# Patient Record
Sex: FEMALE | Race: BLACK OR AFRICAN AMERICAN | NOT HISPANIC OR LATINO | Employment: FULL TIME | URBAN - METROPOLITAN AREA
[De-identification: names, ages, dates, MRNs, and addresses within clinical notes are randomized per-mention and may not be internally consistent; named-entity substitution may affect disease eponyms.]

---

## 2018-09-28 ENCOUNTER — HOSPITAL ENCOUNTER (EMERGENCY)
Facility: HOSPITAL | Age: 33
Discharge: HOME | End: 2018-09-29
Attending: EMERGENCY MEDICINE

## 2018-09-28 DIAGNOSIS — S16.1XXA CERVICAL MYOFASCIAL STRAIN, INITIAL ENCOUNTER: Primary | ICD-10-CM

## 2018-09-28 DIAGNOSIS — M54.2 NECK PAIN: ICD-10-CM

## 2018-09-28 PROCEDURE — 99283 EMERGENCY DEPT VISIT LOW MDM: CPT | Mod: 25

## 2018-09-28 PROCEDURE — 96372 THER/PROPH/DIAG INJ SC/IM: CPT

## 2018-09-28 RX ORDER — KETOROLAC TROMETHAMINE 30 MG/ML
60 INJECTION, SOLUTION INTRAMUSCULAR; INTRAVENOUS
Status: COMPLETED | OUTPATIENT
Start: 2018-09-28 | End: 2018-09-29

## 2018-09-29 VITALS
HEART RATE: 76 BPM | WEIGHT: 125 LBS | OXYGEN SATURATION: 99 % | SYSTOLIC BLOOD PRESSURE: 122 MMHG | BODY MASS INDEX: 22.14 KG/M2 | DIASTOLIC BLOOD PRESSURE: 82 MMHG | TEMPERATURE: 98 F | RESPIRATION RATE: 20 BRPM

## 2018-09-29 PROCEDURE — 63600175 PHARM REV CODE 636 W HCPCS: Performed by: EMERGENCY MEDICINE

## 2018-09-29 RX ORDER — KETOROLAC TROMETHAMINE 10 MG/1
10 TABLET, FILM COATED ORAL EVERY 8 HOURS
Qty: 15 TABLET | Refills: 0 | Status: SHIPPED | OUTPATIENT
Start: 2018-09-29 | End: 2018-10-04

## 2018-09-29 RX ADMIN — KETOROLAC TROMETHAMINE 60 MG: 30 INJECTION, SOLUTION INTRAMUSCULAR at 12:09

## 2018-09-29 NOTE — ED NOTES
Pt updated on plan of care. Informed that x-ray has resulted and MD will be in shortly in order to discuss results. Understanding verbalized. Pt remains AAOx4. Respirations even and unlabored. Skin warm and dry. NAD noted. Call light remains in reach. Pt instructed to notify staff should needs arise. Understanding verbalized. Awaiting further orders and will continue to monitor.

## 2018-09-29 NOTE — ED TRIAGE NOTES
"pt reports neck pain/stiffness x 2-3 days; states "I can't even turn my head now." No medications taken to treat. Denies recent injury.  "

## 2018-09-29 NOTE — ED PROVIDER NOTES
"Encounter Date: 9/28/2018       History     Chief Complaint   Patient presents with    Neck Pain     pt reports neck pain/stiffness x 2-3 days; states "I can't even turn my head now." No medications taken to treat. Denies recent injury. Right ear pain.    Otalgia     The history is provided by the patient.   Neck Pain    This is a new problem. The current episode started two days ago. The problem occurs constantly. The problem has been unchanged. The pain is associated with an unknown factor. The pain is present in the left side. The quality of the pain is described as stabbing. The pain does not radiate. The pain is at a severity of 6/10. The symptoms are aggravated by position. The pain is the same all the time. Pertinent negatives include no chest pain, no syncope, no numbness, no weight loss, no headaches and no leg pain. She has tried nothing for the symptoms.     Review of patient's allergies indicates:  No Known Allergies  Past Medical History:   Diagnosis Date    Scoliosis      History reviewed. No pertinent surgical history.  No family history on file.  Social History     Tobacco Use    Smoking status: Never Smoker   Substance Use Topics    Alcohol use: Yes     Frequency: Never     Comment: occasionally    Drug use: Not on file     Review of Systems   Constitutional: Negative for weight loss.   Cardiovascular: Negative for chest pain and syncope.   Musculoskeletal: Positive for neck pain.   Neurological: Negative for numbness and headaches.   All other systems reviewed and are negative.      Physical Exam     Initial Vitals [09/28/18 2223]   BP Pulse Resp Temp SpO2   122/82 79 20 97.9 °F (36.6 °C) 99 %      MAP       --         Physical Exam    Nursing note and vitals reviewed.  Constitutional: She appears well-developed and well-nourished.   HENT:   Head: Normocephalic and atraumatic.   Eyes: Conjunctivae and EOM are normal.   Neck: Muscular tenderness present. No spinous process tenderness present. " Decreased range of motion present. No neck rigidity.       Cardiovascular: Normal rate, regular rhythm and normal heart sounds.   Pulmonary/Chest: Breath sounds normal. She has no wheezes. She has no rhonchi. She has no rales.   Abdominal: Soft. She exhibits no distension. There is no tenderness. There is no rebound and no guarding.   Neurological: She is alert. She has normal strength. GCS score is 15. GCS eye subscore is 4. GCS verbal subscore is 5. GCS motor subscore is 6.   Skin: Skin is warm and dry. Capillary refill takes less than 2 seconds.   Psychiatric: She has a normal mood and affect. Her behavior is normal. Judgment and thought content normal.         ED Course   Procedures  Labs Reviewed - No data to display       Imaging Results          X-Ray Cervical Spine AP And Lateral (Final result)  Result time 09/28/18 22:51:06    Final result by Cruz Bradley MD (09/28/18 22:51:06)                 Impression:      Straightening of the normal cervical lordosis.    Odontoid partially obscured by overlapping structures.  Otherwise negative for evidence of fracture.      Electronically signed by: Cruz Bradley MD  Date:    09/28/2018  Time:    22:51             Narrative:    EXAMINATION:  XR CERVICAL SPINE AP LATERAL    CLINICAL HISTORY:  Cervicalgia    COMPARISON:  None.    FINDINGS:  Straightening of the normal cervical lordosis.  Odontoid is partially obscured by overlapping structures.  All cervical vertebral bodies are normal in height.  Disc spaces are well preserved.  Prevertebral soft tissues are normal.                              X-Rays:   Independently Interpreted Readings:   Other Readings:  No fracture or dislocation    Medical Decision Making:   Clinical Tests:   Radiological Study: Ordered and Reviewed                      Clinical Impression:   The primary encounter diagnosis was Cervical myofascial strain, initial encounter. A diagnosis of Neck pain was also pertinent to this  visit.      Disposition:   Disposition: Discharged  Condition: Stable                        Lorenza Vargas MD  09/29/18 0030

## 2020-01-30 ENCOUNTER — HOSPITAL ENCOUNTER (EMERGENCY)
Facility: HOSPITAL | Age: 35
Discharge: HOME | End: 2020-01-30
Attending: FAMILY MEDICINE
Payer: COMMERCIAL

## 2020-01-30 VITALS
RESPIRATION RATE: 20 BRPM | OXYGEN SATURATION: 99 % | SYSTOLIC BLOOD PRESSURE: 113 MMHG | HEART RATE: 97 BPM | DIASTOLIC BLOOD PRESSURE: 77 MMHG | TEMPERATURE: 99 F

## 2020-01-30 DIAGNOSIS — J06.9 VIRAL URI: ICD-10-CM

## 2020-01-30 DIAGNOSIS — J10.1 INFLUENZA A: Primary | ICD-10-CM

## 2020-01-30 LAB
INFLUENZA A, MOLECULAR: POSITIVE
INFLUENZA B, MOLECULAR: NEGATIVE
SPECIMEN SOURCE: ABNORMAL

## 2020-01-30 PROCEDURE — 87502 INFLUENZA DNA AMP PROBE: CPT | Mod: ER

## 2020-01-30 PROCEDURE — 99284 EMERGENCY DEPT VISIT MOD MDM: CPT | Mod: ER

## 2020-01-30 PROCEDURE — 25000003 PHARM REV CODE 250: Mod: ER | Performed by: FAMILY MEDICINE

## 2020-01-30 RX ORDER — GUAIFENESIN AND DEXTROMETHORPHAN HYDROBROMIDE 1200; 60 MG/1; MG/1
1 TABLET, EXTENDED RELEASE ORAL 2 TIMES DAILY PRN
Qty: 20 TABLET | Refills: 0 | Status: SHIPPED | OUTPATIENT
Start: 2020-01-30 | End: 2023-02-16

## 2020-01-30 RX ORDER — BENZONATATE 200 MG/1
200 CAPSULE ORAL 3 TIMES DAILY PRN
Qty: 30 CAPSULE | Refills: 0 | Status: SHIPPED | OUTPATIENT
Start: 2020-01-30 | End: 2020-02-09

## 2020-01-30 RX ORDER — ACETAMINOPHEN 500 MG
1000 TABLET ORAL
Status: COMPLETED | OUTPATIENT
Start: 2020-01-30 | End: 2020-01-30

## 2020-01-30 RX ADMIN — ACETAMINOPHEN 1000 MG: 500 TABLET ORAL at 07:01

## 2020-01-31 NOTE — ED PROVIDER NOTES
Encounter Date: 1/30/2020       History     Chief Complaint   Patient presents with    Fever     fever body aches, cough congestion xs 4 days     34-year-old female presents with diffuse body aches, nasal congestion and cough for last 4 days.  He has been taking over-the-counter medication.  Fever on arrival is 102.4.  No respiratory distress.    The history is provided by the patient.     Review of patient's allergies indicates:  No Known Allergies  Past Medical History:   Diagnosis Date    Scoliosis      History reviewed. No pertinent surgical history.  History reviewed. No pertinent family history.  Social History     Tobacco Use    Smoking status: Never Smoker    Smokeless tobacco: Never Used   Substance Use Topics    Alcohol use: Yes     Frequency: Never     Comment: occasionally    Drug use: Never     Review of Systems   Constitutional: Positive for activity change, appetite change, chills and fever.   HENT: Negative for congestion, drooling, ear pain, mouth sores, nosebleeds, sinus pressure and sore throat.    Eyes: Negative for pain, discharge and visual disturbance.   Respiratory: Positive for cough. Negative for choking, shortness of breath and wheezing.    Cardiovascular: Negative for chest pain.   Gastrointestinal: Negative for abdominal pain, blood in stool, constipation, nausea and vomiting.   Genitourinary: Negative for dysuria.   Musculoskeletal: Negative for back pain and neck pain.   Skin: Negative for color change, pallor and rash.        No rash, no erythema,no bruises, no pallor,    Neurological: Negative for seizures, light-headedness and headaches.   All other systems reviewed and are negative.      Physical Exam     Initial Vitals [01/30/20 1856]   BP Pulse Resp Temp SpO2   118/72 107 20 (!) 102.4 °F (39.1 °C) 96 %      MAP       --         Physical Exam    Nursing note and vitals reviewed.  Constitutional: Vital signs are normal. She appears well-developed and well-nourished. She is  not diaphoretic. She is active. No distress.   HENT:   Head: Normocephalic and atraumatic.   Right Ear: Tympanic membrane normal.   Left Ear: Tympanic membrane normal.   Nose: Nose normal.   Mouth/Throat: Oropharynx is clear and moist.   Eyes: Conjunctivae, EOM and lids are normal. Pupils are equal, round, and reactive to light.   Neck: Trachea normal, normal range of motion and full passive range of motion without pain. Neck supple. Normal range of motion present. No neck rigidity.   Cardiovascular: Normal rate, regular rhythm, S1 normal, S2 normal, normal heart sounds, intact distal pulses and normal pulses.   Pulmonary/Chest: Breath sounds normal. No respiratory distress. She has no wheezes. She has no rhonchi. She has no rales.   Abdominal: Soft. Normal appearance and bowel sounds are normal. She exhibits no distension. There is no tenderness.   Musculoskeletal: Normal range of motion.   Lymphadenopathy:     She has no cervical adenopathy.   Neurological: She is alert and oriented to person, place, and time. She has normal strength and normal reflexes. No cranial nerve deficit or sensory deficit. GCS score is 15. GCS eye subscore is 4. GCS verbal subscore is 5. GCS motor subscore is 6.   Skin: Skin is warm and intact. Capillary refill takes less than 2 seconds. No abrasion, no bruising and no rash noted.   Psychiatric: She has a normal mood and affect. Her speech is normal and behavior is normal. Judgment and thought content normal. She is not actively hallucinating. Cognition and memory are normal. She is attentive.         ED Course   Procedures  Labs Reviewed   INFLUENZA A & B BY MOLECULAR - Abnormal; Notable for the following components:       Result Value    Influenza A, Molecular Positive (*)     All other components within normal limits          Imaging Results    None          Medical Decision Making:   Initial Assessment:   34-year-old female presents with upper respiratory infection with  fever.  Differential Diagnosis:   URI, influenza,  Bronchitis.  Clinical Tests:   Lab Tests: Ordered and Reviewed       <> Summary of Lab: Patient is positive for flu A.  ED Management:  Influenza a positive. Patient had symptoms for last 4 days.  No role of Tamiflu.  Patient is treated with Tylenol, Motrin.  Tessalon Perles for cough.  Mucinex DM and advised to take adequate fluids.  Follow-up ED with any worsening symptoms.                                 Clinical Impression:       ICD-10-CM ICD-9-CM   1. Influenza A J10.1 487.1   2. Viral URI J06.9 465.9         Disposition:   Disposition: Discharged  Condition: Stable                     Rex Haro MD  01/30/20 3385

## 2020-01-31 NOTE — ED TRIAGE NOTES
"Reports to ED c c/o cough, congestion, and fever x 4 days.  States took motrin, however; none today. States symptoms got worse. Non productive cough. States "when I breathe it hurts my chest." Also reports lower back pain. HX of scoliosis.   "

## 2020-09-04 ENCOUNTER — HOSPITAL ENCOUNTER (EMERGENCY)
Facility: HOSPITAL | Age: 35
Discharge: ANOTHER HOSPITAL | End: 2020-09-05
Attending: EMERGENCY MEDICINE
Payer: COMMERCIAL

## 2020-09-04 DIAGNOSIS — S61.419A HAND LACERATION INVOLVING TENDON, INITIAL ENCOUNTER: Primary | ICD-10-CM

## 2020-09-04 DIAGNOSIS — S66.929A HAND LACERATION INVOLVING TENDON, INITIAL ENCOUNTER: Primary | ICD-10-CM

## 2020-09-04 PROCEDURE — 25000003 PHARM REV CODE 250: Mod: ER | Performed by: EMERGENCY MEDICINE

## 2020-09-04 PROCEDURE — 90715 TDAP VACCINE 7 YRS/> IM: CPT | Mod: ER | Performed by: EMERGENCY MEDICINE

## 2020-09-04 PROCEDURE — 63600175 PHARM REV CODE 636 W HCPCS: Mod: ER | Performed by: EMERGENCY MEDICINE

## 2020-09-04 PROCEDURE — 12002 RPR S/N/AX/GEN/TRNK2.6-7.5CM: CPT | Mod: ER

## 2020-09-04 PROCEDURE — 90471 IMMUNIZATION ADMIN: CPT | Mod: ER | Performed by: EMERGENCY MEDICINE

## 2020-09-04 PROCEDURE — 96365 THER/PROPH/DIAG IV INF INIT: CPT | Mod: ER

## 2020-09-04 PROCEDURE — 99291 CRITICAL CARE FIRST HOUR: CPT | Mod: 25,ER

## 2020-09-04 PROCEDURE — 96376 TX/PRO/DX INJ SAME DRUG ADON: CPT | Mod: ER

## 2020-09-04 PROCEDURE — 96375 TX/PRO/DX INJ NEW DRUG ADDON: CPT | Mod: ER

## 2020-09-04 RX ORDER — ONDANSETRON 2 MG/ML
4 INJECTION INTRAMUSCULAR; INTRAVENOUS
Status: COMPLETED | OUTPATIENT
Start: 2020-09-04 | End: 2020-09-04

## 2020-09-04 RX ORDER — MORPHINE SULFATE 4 MG/ML
2 INJECTION, SOLUTION INTRAMUSCULAR; INTRAVENOUS
Status: COMPLETED | OUTPATIENT
Start: 2020-09-04 | End: 2020-09-04

## 2020-09-04 RX ORDER — LIDOCAINE HYDROCHLORIDE 10 MG/ML
10 INJECTION, SOLUTION EPIDURAL; INFILTRATION; INTRACAUDAL; PERINEURAL
Status: COMPLETED | OUTPATIENT
Start: 2020-09-04 | End: 2020-09-05

## 2020-09-04 RX ORDER — CEFAZOLIN SODIUM 2 G/50ML
2 SOLUTION INTRAVENOUS
Status: COMPLETED | OUTPATIENT
Start: 2020-09-04 | End: 2020-09-05

## 2020-09-04 RX ADMIN — MORPHINE SULFATE 2 MG: 4 INJECTION INTRAVENOUS at 11:09

## 2020-09-04 RX ADMIN — CEFAZOLIN SODIUM 2 G: 2 SOLUTION INTRAVENOUS at 11:09

## 2020-09-04 RX ADMIN — CLOSTRIDIUM TETANI TOXOID ANTIGEN (FORMALDEHYDE INACTIVATED), CORYNEBACTERIUM DIPHTHERIAE TOXOID ANTIGEN (FORMALDEHYDE INACTIVATED), BORDETELLA PERTUSSIS TOXOID ANTIGEN (GLUTARALDEHYDE INACTIVATED), BORDETELLA PERTUSSIS FILAMENTOUS HEMAGGLUTININ ANTIGEN (FORMALDEHYDE INACTIVATED), BORDETELLA PERTUSSIS PERTACTIN ANTIGEN, AND BORDETELLA PERTUSSIS FIMBRIAE 2/3 ANTIGEN 0.5 ML: 5; 2; 2.5; 5; 3; 5 INJECTION, SUSPENSION INTRAMUSCULAR at 11:09

## 2020-09-04 RX ADMIN — SODIUM CHLORIDE 1000 ML: 0.9 INJECTION, SOLUTION INTRAVENOUS at 11:09

## 2020-09-04 RX ADMIN — ONDANSETRON 4 MG: 2 INJECTION INTRAMUSCULAR; INTRAVENOUS at 11:09

## 2020-09-05 VITALS
RESPIRATION RATE: 18 BRPM | HEIGHT: 63 IN | TEMPERATURE: 99 F | WEIGHT: 138 LBS | SYSTOLIC BLOOD PRESSURE: 107 MMHG | BODY MASS INDEX: 24.45 KG/M2 | HEART RATE: 90 BPM | DIASTOLIC BLOOD PRESSURE: 70 MMHG | OXYGEN SATURATION: 100 %

## 2020-09-05 LAB
B-HCG UR QL: NEGATIVE
SARS-COV-2 RDRP RESP QL NAA+PROBE: NEGATIVE

## 2020-09-05 PROCEDURE — U0002 COVID-19 LAB TEST NON-CDC: HCPCS | Mod: ER

## 2020-09-05 PROCEDURE — 25000003 PHARM REV CODE 250: Mod: ER | Performed by: EMERGENCY MEDICINE

## 2020-09-05 PROCEDURE — 81025 URINE PREGNANCY TEST: CPT | Mod: ER

## 2020-09-05 PROCEDURE — 63600175 PHARM REV CODE 636 W HCPCS: Mod: ER | Performed by: EMERGENCY MEDICINE

## 2020-09-05 RX ORDER — MORPHINE SULFATE 4 MG/ML
2 INJECTION, SOLUTION INTRAMUSCULAR; INTRAVENOUS
Status: COMPLETED | OUTPATIENT
Start: 2020-09-05 | End: 2020-09-05

## 2020-09-05 RX ADMIN — MORPHINE SULFATE 2 MG: 4 INJECTION INTRAVENOUS at 01:09

## 2020-09-05 RX ADMIN — LIDOCAINE HYDROCHLORIDE 100 MG: 10 INJECTION, SOLUTION EPIDURAL; INFILTRATION; INTRACAUDAL; PERINEURAL at 12:09

## 2020-09-05 NOTE — ED NOTES
Adult Physical Assessment  LOC: 35y.o. female verified via two identifiers.  The patient is awake, alert, oriented and speaking appropriately at this time.. (Reports anxious and upset with herself from cutting hand)  APPEARANCE: Patient resting comfortably and appears to be in no acute distress at this time. Patient is clean and well groomed.  SKIN:The skin is warm and dry, color consistent with ethnicity, patient has normal skin turgor and moist mucus membranes, 1.5cm lac to lt thumb & aprox 4 inc open laceration to left hand. Bleeding - pressure dressing applied.  MUSCULOSKELETAL: Patient moving all extremities well, no obvious swelling or deformities noted.   RESPIRATORY: Airway is open and patent, respirations are spontaneous, patient has a normal effort and rate, no accessory muscle use noted. Visible chest rise noted.  CARDIAC: Patient has a normal rate and rhythm, no periphreal edema noted in any extremity, capillary refill < 3 seconds in all extremities. Bilateral radial peripheral pulse +2.

## 2020-09-05 NOTE — ED NOTES
Report called to Katie QUEVEDO at [facility]  Bedside report given to Ashok with [address] [facility].  Pt transferred in stable condition. Copy of ER records and Radiology in envelope given to paramedic.

## 2020-09-05 NOTE — ED PROVIDER NOTES
COVID Statement  The [place] [employer] and Danial Holloway, Governor of the [facility], have declared a State of Public Health Emergency due to the spread of a novel coronavirus and disease (COVID-19).  There is no currently accepted treatment except conservative measures and respiratory support if appropriate.  This has lead to significant resource capacity and potential delays in care.      Encounter Date: 9/4/2020       History     Chief Complaint   Patient presents with    Laceration     36 y/o F to er with large laceration on her left hand. Pt has supercial 1.5 cm cut on conner aspect of rt thumb & approx 5 inch open laceration to from mid 5th digit to bottom of palm, actively bleeding. Pressure dressing applied with guaze.      Fernanda Gabriel is a 35 y.o. female who  has a past medical history of Scoliosis.      She presents the ER today for laceration to her left hand.  She was using a knife in kitchen and 'moving too fast' when she accidentally cut herself. She is unable to move her little and ring finger. She denies numbness or other injuries. Last tetanus unknown.             Review of patient's allergies indicates:  No Known Allergies  Past Medical History:   Diagnosis Date    Scoliosis      History reviewed. No pertinent surgical history.  History reviewed. No pertinent family history.  Social History     Tobacco Use    Smoking status: Never Smoker    Smokeless tobacco: Never Used   Substance Use Topics    Alcohol use: Yes     Frequency: Never     Comment: occasionally    Drug use: Never     Review of Systems   Constitutional: Negative for fever.   Respiratory: Negative for shortness of breath.    Cardiovascular: Negative for chest pain.   Skin: Positive for wound.   Neurological: Positive for weakness. Negative for numbness.   Hematological: Does not bruise/bleed easily.       Physical Exam     Initial Vitals [09/04/20 2246]   BP Pulse Resp Temp SpO2   (!) 124/57 91 (!)  23 99.4 °F (37.4 °C) 100 %      MAP       --         Physical Exam    Constitutional: No distress.   HENT:   Head: Normocephalic and atraumatic.   Eyes: Conjunctivae are normal.   Cardiovascular: Intact distal pulses.   Pulmonary/Chest: No respiratory distress.   Musculoskeletal:      Comments: Left upper extremity:  Sensation intact to light touch.  Radial pulse palpable    Approximately 8 cm avulsion to medial palm as shown  After wound exploration flexor tendon appears to be severed muscle belly also severed    Patient unable to flex little and ring finger    Small arterial bleeding that stopped after direct pressure    4 cm very superficial laceration along left thumb on the palmar side.  Able to oppose thumb.  No flexion deficit   Neurological: She is alert.   Skin: Skin is warm and dry.   Psychiatric: She has a normal mood and affect.                 ED Course   Lac Repair    Date/Time: 9/5/2020 3:34 AM  Performed by: Donnell Ugalde Jr., MD  Authorized by: Donnell Ugalde Jr., MD   Consent Done: Emergent Situation  Body area: upper extremity  Location details: left thumb  Laceration length: 4 cm  Tendon involvement: none  Nerve involvement: none  Vascular damage: no  Irrigation solution: saline  Irrigation method: jet lavage  Amount of cleaning: standard  Debridement: none  Degree of undermining: none  Skin closure: glue  Approximation: close  Dressing: Steri-Strips  Patient tolerance: Patient tolerated the procedure well with no immediate complications        Labs Reviewed   PREGNANCY TEST, URINE RAPID          Imaging Results          X-Ray Hand 3 View Left (Final result)  Result time 09/04/20 23:24:18    Final result by Maldonado Wells III, MD (09/04/20 23:24:18)                 Impression:      No acute bony abnormality suggested.      Electronically signed by: Maldonado Wells MD  Date:    09/04/2020  Time:    23:24             Narrative:    EXAMINATION:  XR HAND COMPLETE 3 VIEW LEFT    CLINICAL  HISTORY:  laceration;    COMPARISON:  None    FINDINGS:  No acute fracture.  No dislocation.  No significant arthritic change.  The fingers are held in slight flexion limiting detail.                                 Medical Decision Making:   Differential Diagnosis:   Differential Diagnosis includes, but is not limited to:  Open fracture, vascular injury, tendon/ligament injury, nerve injury, retained foreign body, superficial laceration, abrasion.    ED Management:  Patient with laceration concerning for flexor tendon laceration.  Likely small arterial bleed that is hemostatic.  Pulses palpable.  Her ring and little finger appear warm with good cap refill at this time.    Her tetanus was updated    She is given 2 g of Ancef    Wound to medial hand was irrigated with 100 cc of normal saline and dressed in a wet-to-dry dressing with Coban    X-ray negative for fracture    Patient will be transferred to [facility] for hand evaluation.  She left here without acute event.              Attending Attestation:         Attending Critical Care:   Critical Care Times:   ==============================================================  · Total Critical Care Time - exclusive of procedural time: 30 minutes.  ==============================================================  Critical care was necessary to treat or prevent imminent or life-threatening deterioration of the following conditions: trauma.   Critical care was time spent personally by me on the following activities: examination of patient, re-evaluation of patient's conition, evaluation of patient's response to treatment, obtaining history from patient or relative, ordering lab, x-rays, and/or EKG and ordering and performing treatments and interventions.                             Clinical Impression:       ICD-10-CM ICD-9-CM   1. Hand laceration involving tendon, initial encounter  S61.419A 882.2    S66.929A      Portions of this note were dictated using voice recognition  software and may contain dictation related errors in spelling/grammar/syntax not found on text review                               Donnell Ugalde Jr., MD  09/05/20 [id number]

## 2020-09-05 NOTE — ED NOTES
Dr. Ugalde bedside for lidocaine admin with nurse assist. Pt tolerated well.  Dressing reapplied per Dr. Ugalde. Pulses remain intact.  Bleeding is controlled with pressure dressing.

## 2022-10-17 ENCOUNTER — HOSPITAL ENCOUNTER (EMERGENCY)
Facility: HOSPITAL | Age: 37
Discharge: HOME | End: 2022-10-17
Attending: EMERGENCY MEDICINE
Payer: COMMERCIAL

## 2022-10-17 VITALS
RESPIRATION RATE: 18 BRPM | TEMPERATURE: 98 F | HEART RATE: 74 BPM | WEIGHT: 130 LBS | OXYGEN SATURATION: 98 % | DIASTOLIC BLOOD PRESSURE: 75 MMHG | BODY MASS INDEX: 23.92 KG/M2 | HEIGHT: 62 IN | SYSTOLIC BLOOD PRESSURE: 119 MMHG

## 2022-10-17 DIAGNOSIS — M25.562 LEFT KNEE PAIN: ICD-10-CM

## 2022-10-17 DIAGNOSIS — L03.114 CELLULITIS OF LEFT UPPER EXTREMITY: Primary | ICD-10-CM

## 2022-10-17 LAB — B-HCG UR QL: NEGATIVE

## 2022-10-17 PROCEDURE — 99284 EMERGENCY DEPT VISIT MOD MDM: CPT | Mod: ER

## 2022-10-17 PROCEDURE — 63600175 PHARM REV CODE 636 W HCPCS: Mod: ER | Performed by: EMERGENCY MEDICINE

## 2022-10-17 PROCEDURE — 25000003 PHARM REV CODE 250: Mod: ER | Performed by: EMERGENCY MEDICINE

## 2022-10-17 PROCEDURE — 81025 URINE PREGNANCY TEST: CPT | Mod: ER | Performed by: EMERGENCY MEDICINE

## 2022-10-17 PROCEDURE — 96372 THER/PROPH/DIAG INJ SC/IM: CPT | Performed by: EMERGENCY MEDICINE

## 2022-10-17 RX ORDER — KETOROLAC TROMETHAMINE 30 MG/ML
15 INJECTION, SOLUTION INTRAMUSCULAR; INTRAVENOUS
Status: COMPLETED | OUTPATIENT
Start: 2022-10-17 | End: 2022-10-17

## 2022-10-17 RX ORDER — CLINDAMYCIN HYDROCHLORIDE 150 MG/1
300 CAPSULE ORAL
Status: COMPLETED | OUTPATIENT
Start: 2022-10-17 | End: 2022-10-17

## 2022-10-17 RX ORDER — CLINDAMYCIN HYDROCHLORIDE 150 MG/1
300 CAPSULE ORAL 4 TIMES DAILY
Qty: 56 CAPSULE | Refills: 0 | Status: SHIPPED | OUTPATIENT
Start: 2022-10-17 | End: 2022-10-24

## 2022-10-17 RX ADMIN — CLINDAMYCIN HYDROCHLORIDE 300 MG: 150 CAPSULE ORAL at 09:10

## 2022-10-17 RX ADMIN — KETOROLAC TROMETHAMINE 15 MG: 30 INJECTION, SOLUTION INTRAMUSCULAR; INTRAVENOUS at 09:10

## 2022-10-17 NOTE — Clinical Note
"Fernanda Mustafa" Harvey was seen and treated in our emergency department on 10/17/2022.  She may return to work on 10/20/2022.       If you have any questions or concerns, please don't hesitate to call.      Ashleigh Yan RN    "

## 2022-10-18 NOTE — PROGRESS NOTES
"Subjective:      Patient ID: Fernanda Gabriel is a 37 y.o. female.    Chief Complaint: Pain and Swelling of the Left Knee (Patient presents today as a new patient complaining her left knee pop out of place. Patient stated it pop out of place 2 days ago. )      GELA Navarro)    Seen in ED on 10/17/22 with left knee pain- felt like it popped out of place along with cellulitis of left upper arm. Had similar episode years ago- was told she had a torn ligament.     Left knee popped and gave way 2 days ago. She has intermittent pain in left knee that is worse with standing and walking. Swelling initially that has improved. She rates her pain as a 0 on a scale of 1-10. No giving way or locking.     Given clindamycin in ED for left upper arm cellulitis- is picking it up today. She is not taking anything for her knee. No PT, injections, or surgery on her knee.       Past Medical History:   Diagnosis Date    Scoliosis          Current Outpatient Medications:     clindamycin (CLEOCIN) 150 MG capsule, Take 2 capsules (300 mg total) by mouth 4 (four) times daily. for 7 days, Disp: 56 capsule, Rfl: 0    dextromethorphan-guaifenesin 60-1,200 mg per 12 hr tablet, Take 1 tablet by mouth 2 (two) times daily as needed., Disp: 20 tablet, Rfl: 0    Review of patient's allergies indicates:  No Known Allergies    Review of Systems   Constitutional: Negative for chills, fever, night sweats and weight gain.   Gastrointestinal:  Negative for bowel incontinence, nausea and vomiting.   Genitourinary:  Negative for bladder incontinence.   Neurological:  Negative for disturbances in coordination and loss of balance.         Objective:        Ht 5' 2" (1.575 m)   Wt 63.9 kg (140 lb 14.4 oz)   LMP 10/04/2022   BMI 25.77 kg/m²     General    Vitals reviewed.  Constitutional: She is oriented to person, place, and time. She appears well-developed and well-nourished.   Pulmonary/Chest: Effort normal.   Abdominal: She exhibits no distension. "   Neurological: She is alert and oriented to person, place, and time.   Psychiatric: She has a normal mood and affect. Her behavior is normal. Judgment and thought content normal.         Body habitus is normal.   The patient walks with a limp.      RIGHT KNEE EXAM:  Resisted SLR negative.   The skin over the knee is intact.  Knee effusion none   Tendernes is located  n/a  Range of motion- Flexion full, Extension full,     Ligament exam:   MCL intact   Lachman intact              Post sag intact    LCL intact    Patellar apprehension negative.  Popliteal cyst negative  Patellar crepitation absent.  Flexion/pinch negative.    Motor normal 5/5 strength in all tested muscle groups.   Sensory normal.      LEFT KNEE EXAM:  Resisted SLR negative.   The skin over the knee is intact.  Knee effusion not significant   Tendernes is located medial and lateral  Range of motion- Flexion full, Extension full,     Ligament exam:   MCL intact   Lachman intact              Post sag intact    LCL intact    Patellar apprehension negative.  Popliteal cyst negative  Patellar crepitation absent.  Flexion/pinch negative.    Motor normal 5/5 strength in all tested muscle groups.   Sensory normal.    Left upper arm shows minimal redness. No heat or swelling noted.       XRAY INTERPRETATION:  X-rays of left knee dated 10/17/22 are personally reviewed and show no fracture or dislocation.         Assessment:       Encounter Diagnosis   Name Primary?    Acute pain of left knee           Plan:       Fernanda was seen today for pain and swelling.    Diagnoses and all orders for this visit:    Acute pain of left knee  -     Ambulatory referral/consult to Orthopedics  -     Ambulatory referral/consult to Physical/Occupational Therapy; Future    Left knee popped and gave way 2 days ago. She has intermittent pain in left knee that is worse with standing and walking. No giving way or locking since.     XRs show no fracture or dislocation of left knee.      Treatment options reviewed with patient along with above left knee xrays. Following plan made:     - PT orders for left knee sent to [facility].   - She does not like to take medications. Can do OTC motrin/tylenol as directed. Reviewed dosing and side effects. Take with food.   - Given hinged knee brace. Will wear this prn comfort/support with prolonged walking.   - If no improvement with above, consider MRI of left knee.   - She will  antibiotics today for upper arm cellulitis. Follow up with PCP with concerns.   - Note given for her to be out of work this week.      Follow up in about 3 months (around 1/19/2023).

## 2022-10-18 NOTE — ED PROVIDER NOTES
"Encounter Date: 10/17/2022       History     Chief Complaint   Patient presents with    Knee Pain     Pt c/o    Cellulitis     Pt c/o left sided knee pain, "I think my knee popped out of place." Pt is c/o of redness and swelling to the left upper arm that she noticed on Saturday but has worsened.      HPI  37 y.o.   Co L knee pain felt popping this AM   No pshx to knee   Able to walk, bend    Unsure of injury    Also L arm swelling, redness in patch; unsure of injury  Worried it was spider bite  No h/o cellulitis    Review of patient's allergies indicates:  No Known Allergies  Past Medical History:   Diagnosis Date    Scoliosis      History reviewed. No pertinent surgical history.  History reviewed. No pertinent family history.  Social History     Tobacco Use    Smoking status: Never    Smokeless tobacco: Never   Substance Use Topics    Alcohol use: Yes     Comment: occasionally    Drug use: Never     Review of Systems  All systems were reviewed/examined and were negative except as noted in the HPI.    Physical Exam     Initial Vitals [10/17/22 1942]   BP Pulse Resp Temp SpO2   117/72 74 20 98 °F (36.7 °C) 100 %      MAP       --         Physical Exam    General: the patient is awake, alert, and in no apparent distress.  Head: normocephalic and atraumatic, sclera are clear  Neck: supple without meningismus  Chest: no respiratory distress  Heart: regular rate and rhythm  Extremities: warm and well perfused     L upper arm small patch of cellulitis    Not involving joint     No palp fluctuance, induration    L knee quad intact, no gross laxity, no sig effusion  Rest of LLE wnl  Skin: warm and dry  Psych conversant  Neuro: awake, alert, moving all extremities    ED Course   Procedures  Labs Reviewed   PREGNANCY TEST, URINE RAPID    Narrative:     Specimen Source->Urine          Imaging Results              X-Ray Knee 3 View Left (Final result)  Result time 10/17/22 21:30:21      Final result by Davey Reveles MD " (10/17/22 21:30:21)                   Impression:      No acute abnormality identified.  Clinical correlation and further evaluation as warranted.      Electronically signed by: Davey Anushka  Date:    10/17/2022  Time:    21:30               Narrative:    EXAMINATION:  XR KNEE 3 VIEW LEFT    CLINICAL HISTORY:  Pain in left knee    TECHNIQUE:  AP, lateral, and Merchant views of the left knee were performed.    COMPARISON:  None    FINDINGS:  No fracture.  No traumatic malalignment.  No osseous destructive process.    No significant joint effusion.  Minimal degenerative changes.                                       Medications   ketorolac injection 15 mg (15 mg Intramuscular Given 10/17/22 2103)   clindamycin capsule 300 mg (300 mg Oral Given 10/17/22 2103)         Medical Decision Making:    This is an emergent evaluation of a patient presenting to the ED.  Nursing notes were reviewed.  Imaging reviewed by me, personally and independently, and prelims if available.  No acute/emergent findings noted on radiologic studies ordered.    Hcg neg      I reviewed radiology images personally along with interpretations.  I personally reviewed and interpreted the laboratory results.    I decided to obtain and review old medical records, which showed: well care/ED visits    Evaluation for Emergency Medical Condition  The patient received a medical screening exam and within a reasonable degree of clinical confidence an emergency medical condition has not been identified.  The patient is instructed on proper follow up and return precautions to the ED.    The patient was encouraged strongly to get the COVID-19 vaccine either after asymptomatic (if COVID positive) or offered it here in the ED is COVID negative.      To Perry MD, MONICA                       Clinical Impression:   Final diagnoses:  [M25.562] Left knee pain  [L03.114] Cellulitis of left upper extremity (Primary)        ED Disposition Condition    Discharge Stable           ED Prescriptions       Medication Sig Dispense Start Date End Date Auth. Provider    clindamycin (CLEOCIN) 150 MG capsule Take 2 capsules (300 mg total) by mouth 4 (four) times daily. for 7 days 56 capsule 10/17/2022 10/24/2022 Butch Perry MD          Follow-up Information       Follow up With Specialties Details Why Contact Info Additional Information    [facility] Internal Medicine Internal Medicine Schedule an appointment as soon as possible for a visit   [address]  [facility] [id number] [phone] Please park in surface lot and check in at [address].    [facility] Orthopedics Orthopedics Schedule an appointment as soon as possible for a visit   [address] [facility] [id number] [phone] [address]          Discharged to home in stable condition, return to ED warnings given, follow up and patient care instructions given.      To Perry MD, MONICA, FACEP  Department of Emergency Medicine       Butch Perry MD  10/19/22 0118

## 2022-10-19 ENCOUNTER — OFFICE VISIT (OUTPATIENT)
Dept: ORTHOPEDICS | Facility: CLINIC | Age: 37
End: 2022-10-19
Payer: COMMERCIAL

## 2022-10-19 VITALS — HEIGHT: 62 IN | BODY MASS INDEX: 25.92 KG/M2 | WEIGHT: 140.88 LBS

## 2022-10-19 DIAGNOSIS — M25.562 ACUTE PAIN OF LEFT KNEE: ICD-10-CM

## 2022-10-19 PROCEDURE — 1159F PR MEDICATION LIST DOCUMENTED IN MEDICAL RECORD: ICD-10-PCS | Mod: CPTII,S$GLB,, | Performed by: PHYSICIAN ASSISTANT

## 2022-10-19 PROCEDURE — 99999 PR PBB SHADOW E&M-EST. PATIENT-LVL IV: CPT | Mod: PBBFAC,,, | Performed by: PHYSICIAN ASSISTANT

## 2022-10-19 PROCEDURE — 99999 PR PBB SHADOW E&M-EST. PATIENT-LVL IV: ICD-10-PCS | Mod: PBBFAC,,, | Performed by: PHYSICIAN ASSISTANT

## 2022-10-19 PROCEDURE — 99203 PR OFFICE/OUTPT VISIT, NEW, LEVL III, 30-44 MIN: ICD-10-PCS | Mod: S$GLB,,, | Performed by: PHYSICIAN ASSISTANT

## 2022-10-19 PROCEDURE — 1160F RVW MEDS BY RX/DR IN RCRD: CPT | Mod: CPTII,S$GLB,, | Performed by: PHYSICIAN ASSISTANT

## 2022-10-19 PROCEDURE — 1160F PR REVIEW ALL MEDS BY PRESCRIBER/CLIN PHARMACIST DOCUMENTED: ICD-10-PCS | Mod: CPTII,S$GLB,, | Performed by: PHYSICIAN ASSISTANT

## 2022-10-19 PROCEDURE — 1159F MED LIST DOCD IN RCRD: CPT | Mod: CPTII,S$GLB,, | Performed by: PHYSICIAN ASSISTANT

## 2022-10-19 PROCEDURE — 99203 OFFICE O/P NEW LOW 30 MIN: CPT | Mod: S$GLB,,, | Performed by: PHYSICIAN ASSISTANT

## 2022-10-19 NOTE — PATIENT INSTRUCTIONS
It was nice to meet you today! I am sorry that you are hurting so much.     Your knee xrays look good.     Wear the knee brace as needed for prolonged walking. This should give you added support and help with pain.     You can take over the counter motrin or tylenol as needed to help with pain/inflammation. Take as directed with food.     [facility]. They should call you to set up, but if not you can call [phone].     If no improvement with above, we can consider an MRI of your left knee.      antibiotic for your arm and take as directed. If this gets worse, follow up with PCP.     I will see you back in 3 months, but please stay in touch and call me if you need anything. You can also send me a message in [facility].     Alma   [phone]

## 2023-02-16 ENCOUNTER — OFFICE VISIT (OUTPATIENT)
Dept: OBSTETRICS AND GYNECOLOGY | Facility: CLINIC | Age: 38
End: 2023-02-16
Payer: COMMERCIAL

## 2023-02-16 VITALS
WEIGHT: 147.25 LBS | SYSTOLIC BLOOD PRESSURE: 110 MMHG | DIASTOLIC BLOOD PRESSURE: 68 MMHG | BODY MASS INDEX: 27.1 KG/M2 | HEIGHT: 62 IN

## 2023-02-16 DIAGNOSIS — N30.01 ACUTE CYSTITIS WITH HEMATURIA: Primary | ICD-10-CM

## 2023-02-16 DIAGNOSIS — R10.2 FEMALE PELVIC PAIN: ICD-10-CM

## 2023-02-16 PROCEDURE — 99203 OFFICE O/P NEW LOW 30 MIN: CPT | Mod: S$GLB,,, | Performed by: OBSTETRICS & GYNECOLOGY

## 2023-02-16 PROCEDURE — 87591 N.GONORRHOEAE DNA AMP PROB: CPT | Performed by: OBSTETRICS & GYNECOLOGY

## 2023-02-16 PROCEDURE — 3074F SYST BP LT 130 MM HG: CPT | Mod: CPTII,S$GLB,, | Performed by: OBSTETRICS & GYNECOLOGY

## 2023-02-16 PROCEDURE — 3078F DIAST BP <80 MM HG: CPT | Mod: CPTII,S$GLB,, | Performed by: OBSTETRICS & GYNECOLOGY

## 2023-02-16 PROCEDURE — 3008F PR BODY MASS INDEX (BMI) DOCUMENTED: ICD-10-PCS | Mod: CPTII,S$GLB,, | Performed by: OBSTETRICS & GYNECOLOGY

## 2023-02-16 PROCEDURE — 3074F PR MOST RECENT SYSTOLIC BLOOD PRESSURE < 130 MM HG: ICD-10-PCS | Mod: CPTII,S$GLB,, | Performed by: OBSTETRICS & GYNECOLOGY

## 2023-02-16 PROCEDURE — 99999 PR PBB SHADOW E&M-EST. PATIENT-LVL III: ICD-10-PCS | Mod: PBBFAC,,, | Performed by: OBSTETRICS & GYNECOLOGY

## 2023-02-16 PROCEDURE — 1160F PR REVIEW ALL MEDS BY PRESCRIBER/CLIN PHARMACIST DOCUMENTED: ICD-10-PCS | Mod: CPTII,S$GLB,, | Performed by: OBSTETRICS & GYNECOLOGY

## 2023-02-16 PROCEDURE — 3008F BODY MASS INDEX DOCD: CPT | Mod: CPTII,S$GLB,, | Performed by: OBSTETRICS & GYNECOLOGY

## 2023-02-16 PROCEDURE — 99999 PR PBB SHADOW E&M-EST. PATIENT-LVL III: CPT | Mod: PBBFAC,,, | Performed by: OBSTETRICS & GYNECOLOGY

## 2023-02-16 PROCEDURE — 99203 PR OFFICE/OUTPT VISIT, NEW, LEVL III, 30-44 MIN: ICD-10-PCS | Mod: S$GLB,,, | Performed by: OBSTETRICS & GYNECOLOGY

## 2023-02-16 PROCEDURE — 3078F PR MOST RECENT DIASTOLIC BLOOD PRESSURE < 80 MM HG: ICD-10-PCS | Mod: CPTII,S$GLB,, | Performed by: OBSTETRICS & GYNECOLOGY

## 2023-02-16 PROCEDURE — 1160F RVW MEDS BY RX/DR IN RCRD: CPT | Mod: CPTII,S$GLB,, | Performed by: OBSTETRICS & GYNECOLOGY

## 2023-02-16 PROCEDURE — 1159F MED LIST DOCD IN RCRD: CPT | Mod: CPTII,S$GLB,, | Performed by: OBSTETRICS & GYNECOLOGY

## 2023-02-16 PROCEDURE — 1159F PR MEDICATION LIST DOCUMENTED IN MEDICAL RECORD: ICD-10-PCS | Mod: CPTII,S$GLB,, | Performed by: OBSTETRICS & GYNECOLOGY

## 2023-02-16 RX ORDER — SULFAMETHOXAZOLE AND TRIMETHOPRIM 800; 160 MG/1; MG/1
1 TABLET ORAL 2 TIMES DAILY
Qty: 14 TABLET | Refills: 0 | Status: SHIPPED | OUTPATIENT
Start: 2023-02-16

## 2023-02-16 NOTE — PROGRESS NOTES
Subjective:       Patient ID: Fernanda Gabriel is a 37 y.o. female.    Chief Complaint:  Pelvic Pain and Vaginal Pain      History of Present Illness  HPI  Patient comes in today complaining of having a week history of pelvic pain with hematuria, urinary frequency, urgency and pain  Denies fever and chills.  No nausea or vomiting.    Significant back pain.    GYN & OB History  Patient's last menstrual period was 01/24/2023 (exact date).   Date of Last Pap: No result found    OB History   No obstetric history on file.     Past Medical History:   Diagnosis Date    Scoliosis        History reviewed. No pertinent surgical history.    History reviewed. No pertinent family history.    Social History     Socioeconomic History    Marital status: Single   Tobacco Use    Smoking status: Never    Smokeless tobacco: Never   Substance and Sexual Activity    Alcohol use: Yes     Comment: occasionally    Drug use: Never       No current outpatient medications on file.     No current facility-administered medications for this visit.       Review of patient's allergies indicates:   Allergen Reactions    Iodine        Review of Systems  Review of Systems   Constitutional:  Negative for activity change, appetite change, chills, fatigue, fever and unexpected weight change.   HENT:  Negative for mouth sores.    Respiratory:  Negative for cough, shortness of breath and wheezing.    Cardiovascular:  Negative for chest pain and palpitations.   Gastrointestinal:  Negative for abdominal pain, bloating, blood in stool, constipation, nausea and vomiting.   Endocrine: Negative for diabetes and hot flashes.   Genitourinary:  Positive for dysuria, frequency, hematuria and urgency. Negative for dysmenorrhea, dyspareunia, menorrhagia, menstrual problem, pelvic pain, vaginal bleeding, vaginal discharge, vaginal pain, urinary incontinence, postcoital bleeding and vaginal odor.   Musculoskeletal:  Negative for back pain and myalgias.   Integumentary:   Negative for rash, breast mass and nipple discharge.   Neurological:  Negative for seizures and headaches.   Psychiatric/Behavioral:  Negative for depression and sleep disturbance. The patient is not nervous/anxious.    Breast: Negative for mass, mastodynia and nipple discharge        Objective:    Physical Exam:   Constitutional: She appears well-developed and well-nourished. No distress.   BMI of 26.94    HENT:   Head: Normocephalic and atraumatic.    Eyes: EOM are normal.      Pulmonary/Chest: Effort normal. No respiratory distress.   Breasts: Non-tender, no engorgement, no masses, no retraction, no discharge. Negative for lymphadenopathy.         Abdominal: Soft. She exhibits no distension. There is no abdominal tenderness. There is no rebound and no guarding.   Suprapubic tenderness     Genitourinary:    Vagina and uterus normal.   No  no vaginal discharge in the vagina.    Genitourinary Comments: Vulva without any obvious lesions.  Urethral meatus normal size and location without any lesion.  Urethra is non-tender without stricture or discharge.  Bladder is tender.  Vaginal vault with good support.  Minimal white discharge noted.  No obvious lesion.  Normal rugation.  Cervix is without any cervical motion tenderness.  No obvious lesion.  Uterus is small, non-tender, normal contour.  Adnexa is without any masses or tenderness.  Perineum without obvious lesion.               Musculoskeletal: Normal range of motion.       Neurological: She is alert.    Skin: Skin is warm and dry.    Psychiatric: She has a normal mood and affect.     Urine dipstick with leukocytes, nitrites, and blood     Assessment:        1. Acute cystitis with hematuria              Plan:      I have discussed with the patient regarding her condition.  Discussed measures to prevent further episode or recurrent cystitis.  Drink more water  Perineal hygiene.  Cranberry juice  Bactrim DS for one week  GC, CT done

## 2023-02-17 LAB
C TRACH DNA SPEC QL NAA+PROBE: NOT DETECTED
N GONORRHOEA DNA SPEC QL NAA+PROBE: NOT DETECTED

## 2023-06-16 ENCOUNTER — PATIENT MESSAGE (OUTPATIENT)
Dept: PODIATRY | Facility: CLINIC | Age: 38
End: 2023-06-16
Payer: COMMERCIAL

## 2024-08-02 ENCOUNTER — HOSPITAL ENCOUNTER (EMERGENCY)
Facility: HOSPITAL | Age: 39
Discharge: HOME | End: 2024-08-02
Attending: STUDENT IN AN ORGANIZED HEALTH CARE EDUCATION/TRAINING PROGRAM

## 2024-08-02 VITALS
OXYGEN SATURATION: 99 % | BODY MASS INDEX: 24.8 KG/M2 | TEMPERATURE: 98 F | DIASTOLIC BLOOD PRESSURE: 60 MMHG | RESPIRATION RATE: 17 BRPM | HEART RATE: 90 BPM | HEIGHT: 63 IN | WEIGHT: 140 LBS | SYSTOLIC BLOOD PRESSURE: 118 MMHG

## 2024-08-02 DIAGNOSIS — R10.31 RLQ ABDOMINAL PAIN: ICD-10-CM

## 2024-08-02 DIAGNOSIS — R11.2 NAUSEA AND VOMITING, UNSPECIFIED VOMITING TYPE: Primary | ICD-10-CM

## 2024-08-02 DIAGNOSIS — N83.201 RIGHT OVARIAN CYST: ICD-10-CM

## 2024-08-02 LAB
ALBUMIN SERPL BCP-MCNC: 4.4 G/DL (ref 3.5–5.2)
ALP SERPL-CCNC: 63 U/L (ref 38–126)
ALT SERPL W/O P-5'-P-CCNC: 13 U/L (ref 10–44)
ANION GAP SERPL CALC-SCNC: 13 MMOL/L (ref 8–16)
AST SERPL-CCNC: 19 U/L (ref 15–46)
B-HCG UR QL: NEGATIVE
BASOPHILS # BLD AUTO: 0.03 K/UL (ref 0–0.2)
BASOPHILS NFR BLD: 0.2 % (ref 0–1.9)
BILIRUB SERPL-MCNC: 0.7 MG/DL (ref 0.1–1)
CALCIUM SERPL-MCNC: 9 MG/DL (ref 8.7–10.5)
CHLORIDE SERPL-SCNC: 103 MMOL/L (ref 95–110)
CO2 SERPL-SCNC: 26 MMOL/L (ref 23–29)
CREAT SERPL-MCNC: 0.69 MG/DL (ref 0.5–1.4)
CTP QC/QA: YES
DIFFERENTIAL METHOD BLD: ABNORMAL
EOSINOPHIL # BLD AUTO: 0.1 K/UL (ref 0–0.5)
EOSINOPHIL NFR BLD: 0.6 % (ref 0–8)
ERYTHROCYTE [DISTWIDTH] IN BLOOD BY AUTOMATED COUNT: 13.9 % (ref 11.5–14.5)
EST. GFR  (NO RACE VARIABLE): >60 ML/MIN/1.73 M^2
GLUCOSE SERPL-MCNC: 97 MG/DL (ref 70–110)
HCT VFR BLD AUTO: 35.5 % (ref 37–48.5)
HGB BLD-MCNC: 11.5 G/DL (ref 12–16)
IMM GRANULOCYTES # BLD AUTO: 0.06 K/UL (ref 0–0.04)
IMM GRANULOCYTES NFR BLD AUTO: 0.5 % (ref 0–0.5)
INFLUENZA A, MOLECULAR: NEGATIVE
INFLUENZA B, MOLECULAR: NEGATIVE
LIPASE SERPL-CCNC: 44 U/L (ref 23–300)
LYMPHOCYTES # BLD AUTO: 3.9 K/UL (ref 1–4.8)
LYMPHOCYTES NFR BLD: 29.6 % (ref 18–48)
MCH RBC QN AUTO: 30.5 PG (ref 27–31)
MCHC RBC AUTO-ENTMCNC: 32.4 G/DL (ref 32–36)
MCV RBC AUTO: 94 FL (ref 82–98)
MONOCYTES # BLD AUTO: 0.9 K/UL (ref 0.3–1)
MONOCYTES NFR BLD: 7.1 % (ref 4–15)
NEUTROPHILS # BLD AUTO: 8.2 K/UL (ref 1.8–7.7)
NEUTROPHILS NFR BLD: 62 % (ref 38–73)
NRBC BLD-RTO: 0 /100 WBC
PLATELET # BLD AUTO: 340 K/UL (ref 150–450)
PMV BLD AUTO: 9.4 FL (ref 9.2–12.9)
POTASSIUM SERPL-SCNC: 4.1 MMOL/L (ref 3.5–5.1)
PROT SERPL-MCNC: 8.2 G/DL (ref 6–8.4)
RBC # BLD AUTO: 3.77 M/UL (ref 4–5.4)
SARS-COV-2 RDRP RESP QL NAA+PROBE: NEGATIVE
SODIUM SERPL-SCNC: 142 MMOL/L (ref 136–145)
SPECIMEN SOURCE: NORMAL
UUN UR-MCNC: 15 MG/DL (ref 7–17)
WBC # BLD AUTO: 13.18 K/UL (ref 3.9–12.7)

## 2024-08-02 PROCEDURE — 63600175 PHARM REV CODE 636 W HCPCS: Mod: ER

## 2024-08-02 PROCEDURE — U0002 COVID-19 LAB TEST NON-CDC: HCPCS | Mod: ER

## 2024-08-02 PROCEDURE — 85025 COMPLETE CBC W/AUTO DIFF WBC: CPT | Mod: ER

## 2024-08-02 PROCEDURE — 87502 INFLUENZA DNA AMP PROBE: CPT | Mod: ER

## 2024-08-02 PROCEDURE — 96374 THER/PROPH/DIAG INJ IV PUSH: CPT | Mod: ER

## 2024-08-02 PROCEDURE — 80053 COMPREHEN METABOLIC PANEL: CPT | Mod: ER

## 2024-08-02 PROCEDURE — 83690 ASSAY OF LIPASE: CPT | Mod: ER

## 2024-08-02 PROCEDURE — 99284 EMERGENCY DEPT VISIT MOD MDM: CPT | Mod: 25,ER

## 2024-08-02 PROCEDURE — 96375 TX/PRO/DX INJ NEW DRUG ADDON: CPT | Mod: ER

## 2024-08-02 PROCEDURE — 81025 URINE PREGNANCY TEST: CPT | Mod: ER

## 2024-08-02 PROCEDURE — 25500020 PHARM REV CODE 255: Mod: ER

## 2024-08-02 RX ORDER — ONDANSETRON HYDROCHLORIDE 2 MG/ML
4 INJECTION, SOLUTION INTRAVENOUS
Status: COMPLETED | OUTPATIENT
Start: 2024-08-02 | End: 2024-08-02

## 2024-08-02 RX ORDER — ONDANSETRON 4 MG/1
4 TABLET, FILM COATED ORAL EVERY 6 HOURS
Qty: 12 TABLET | Refills: 0 | Status: SHIPPED | OUTPATIENT
Start: 2024-08-02

## 2024-08-02 RX ORDER — DIPHENHYDRAMINE HYDROCHLORIDE 50 MG/ML
50 INJECTION INTRAMUSCULAR; INTRAVENOUS
Status: COMPLETED | OUTPATIENT
Start: 2024-08-02 | End: 2024-08-02

## 2024-08-02 RX ADMIN — DIPHENHYDRAMINE HYDROCHLORIDE 50 MG: 50 INJECTION, SOLUTION INTRAMUSCULAR; INTRAVENOUS at 06:08

## 2024-08-02 RX ADMIN — IOHEXOL 100 ML: 350 INJECTION, SOLUTION INTRAVENOUS at 06:08

## 2024-08-02 RX ADMIN — ONDANSETRON 4 MG: 2 INJECTION INTRAMUSCULAR; INTRAVENOUS at 05:08

## 2024-08-02 NOTE — ED PROVIDER NOTES
"Encounter Date: 8/2/2024       History     Chief Complaint   Patient presents with    Vomiting     Pt states I have been vomiting since 11pm last night, I am not able to drink anything or keep anything down. Pt denies diarrhea and abdominal pain. Pt states she just drank a bottle of water and it has stayed down.      Fernanda Gabriel is a 39 y.o. female who has a past medical history of Scoliosis. presenting to the Emergency Department for vomiting. She reports vomiting started last night. No associated symptoms. She does have some irritation along her "chest wall and stomach from vomiting", but denies abdominal pain. However, there is some periumbilical and RLQ tenderness. No dysuria, hematuria, flank pain. She does suffer from constipation and had last BM 4 days ago, however is passing flatus. No hx of abdominal surgeries. No medications tried. She did have three margaritas and one shot of alcohol last night for a birthday event. Reports infrequent alcohol use. Denies drug use. Unknown chance of pregnancy. She had a bottle of water and a bag of potato chips around 3:30.     The history is provided by the patient.     Review of patient's allergies indicates:   Allergen Reactions    Iodine      Past Medical History:   Diagnosis Date    Scoliosis      No past surgical history on file.  No family history on file.  Social History     Tobacco Use    Smoking status: Never    Smokeless tobacco: Never   Substance Use Topics    Alcohol use: Yes     Comment: occasionally    Drug use: Never     Review of Systems   Constitutional:  Positive for appetite change and fatigue. Negative for chills and fever.   Respiratory:  Negative for cough and shortness of breath.    Cardiovascular:  Negative for chest pain.   Gastrointestinal:  Positive for constipation, nausea and vomiting. Negative for abdominal pain, blood in stool and diarrhea.   Skin:  Negative for color change.   Psychiatric/Behavioral:  Negative for agitation and confusion.  "       Physical Exam     Initial Vitals [08/02/24 1653]   BP Pulse Resp Temp SpO2   (!) 112/59 86 18 97.5 °F (36.4 °C) 97 %      MAP       --         Physical Exam    Nursing note and vitals reviewed.  Constitutional: She appears well-developed and well-nourished. She is not diaphoretic.  Non-toxic appearance. No distress.   HENT:   Head: Normocephalic and atraumatic.   Right Ear: Hearing and external ear normal.   Left Ear: Hearing and external ear normal.   Eyes: EOM are normal.   Neck: Neck supple.   Normal range of motion.  Cardiovascular:  Normal rate.           Pulmonary/Chest: No respiratory distress.   Abdominal: Abdomen is soft. She exhibits no distension and no mass. There is abdominal tenderness (Periumbilical and right lower quadrant). There is no rebound and no guarding.   Musculoskeletal:         General: Normal range of motion.      Cervical back: Normal range of motion and neck supple. Normal range of motion.     Neurological: She is alert and oriented to person, place, and time. GCS score is 15. GCS eye subscore is 4. GCS verbal subscore is 5. GCS motor subscore is 6.   Skin: Skin is warm and dry.   Psychiatric: She has a normal mood and affect. Her behavior is normal. Judgment and thought content normal.         ED Course   Procedures  Labs Reviewed   CBC W/ AUTO DIFFERENTIAL - Abnormal       Result Value    WBC 13.18 (*)     RBC 3.77 (*)     Hemoglobin 11.5 (*)     Hematocrit 35.5 (*)     MCV 94      MCH 30.5      MCHC 32.4      RDW 13.9      Platelets 340      MPV 9.4      Immature Granulocytes 0.5      Gran # (ANC) 8.2 (*)     Immature Grans (Abs) 0.06 (*)     Lymph # 3.9      Mono # 0.9      Eos # 0.1      Baso # 0.03      nRBC 0      Gran % 62.0      Lymph % 29.6      Mono % 7.1      Eosinophil % 0.6      Basophil % 0.2      Differential Method Automated     INFLUENZA A & B BY MOLECULAR    Influenza A, Molecular Negative      Influenza B, Molecular Negative      Flu A & B Source Nasal swab      SARS-COV-2 RNA AMPLIFICATION, QUAL    SARS-CoV-2 RNA, Amplification, Qual Negative     COMPREHENSIVE METABOLIC PANEL    Sodium 142      Potassium 4.1      Chloride 103      CO2 26      Glucose 97      BUN 15      Creatinine 0.69      Calcium 9.0      Total Protein 8.2      Albumin 4.4      Total Bilirubin 0.7      Alkaline Phosphatase 63      AST 19      ALT 13      Anion Gap 13      eGFR >60.0     LIPASE    Lipase Result 44     POCT URINE PREGNANCY    POC Preg Test, Ur Negative       Acceptable Yes            Imaging Results              US Pelvis Complete Non OB (Final result)  Result time 08/02/24 19:44:17   Procedure changed from US Transvaginal Non OB     Final result by Kameron Marin MD (08/02/24 19:44:17)                   Impression:      1.2 cm simple cyst of the right ovary which is likely physiological.  No significant pelvic ultrasound abnormality      Electronically signed by: Kameron Marin  Date:    08/02/2024  Time:    19:44               Narrative:    EXAMINATION:  US PELVIS COMPLETE NON OB    CLINICAL HISTORY:  Pelvic tenderness;  Right lower quadrant pain    TECHNIQUE:  Pelvic ultrasound    COMPARISON:  None    FINDINGS:  The uterus measures 8.9 x 4.2 x 4.7 cm with a 7 mm endometrial stripe.  Vascular flow in both ovaries.  No free fluid.  1.26 cm cyst of the right ovary likely physiological.  The right ovary measures 2.3 x 2 x 2.8 cm.  The left ovary measures 2.3 x 1.4 x 2.0 cm.  No adnexal masses.                                       CT Abdomen Pelvis With IV Contrast NO Oral Contrast (Final result)  Result time 08/02/24 18:25:23      Final result by Kameron Marin MD (08/02/24 18:25:23)                   Impression:      No acute process No evidence for appendicitis.    Hepatomegaly with fatty infiltration liver.    All CT scans at this facility use dose modulation, iterative reconstruction, and/or weight based dosing when appropriate to reduce radiation dose to as low as  reasonably achievable.      Electronically signed by: Kameron Astorgai  Date:    08/02/2024  Time:    18:25               Narrative:    EXAMINATION:  CT ABDOMEN PELVIS WITH IV CONTRAST    CLINICAL HISTORY:  RLQ abdominal pain (Age >= 14y);    TECHNIQUE:  Low dose axial images, sagittal and coronal reformations were obtained from the lung bases to the pubic symphysis following the IV administration of 100 mL of Omnipaque 350.    COMPARISON:  None    FINDINGS:  Heart: Normal size as far as seen. No effusion as far as seen.    Lung Bases: Clear.    Liver: Fatty infiltration liver with hepatomegaly.  No focal lesions.    Gallbladder: No calcified gallstones.    Bile Ducts: No dilatation.    Pancreas: No mass. No peripancreatic fat stranding.    Spleen: Normal.    Adrenals: Normal.    Kidneys/Ureters: Normal enhancement.  No mass or  hydroureteronephrosis.    Bladder: No wall thickening.    Reproductive organs: Normal.    GI Tract/Mesentery: No evidence of bowel obstruction or inflammation.  No evidence of acute appendicitis.  Normal appendix    Peritoneal Space: No ascites or free air.    Retroperitoneum: No significant adenopathy.    Abdominal wall: Small fat containing umbilical hernia.    Vasculature: No aneurysm.    Bones: No acute fracture. No suspicious lytic or sclerotic lesions.                                       Medications   ondansetron injection 4 mg (4 mg Intravenous Given 8/2/24 1737)   diphenhydrAMINE injection 50 mg (50 mg Intravenous Given 8/2/24 1808)   iohexoL (OMNIPAQUE 350) injection 100 mL (100 mLs Intravenous Given 8/2/24 1817)     Medical Decision Making  39-year-old female presents with vomiting.  She presents nontoxic and in no distress.  She is hemodynamically stable, afebrile.  She denies abdominal pain however does have periumbilical and right lower quadrant tenderness that she states is 5/10 sharp pain.  UPT negative.  COVID and flu negative as well.  I will obtain belly labs, supportive  "treatment.  Likely CT abdomen to rule out appendicitis    Appendicitis, cholecystitis, cholangitis, pancreatitis, hepatitis, abdominal aortic aneurysm, diabetic ketoacidosis, bowel obstruction, intra-abdominal perforation, intra-abdominal infection vs. abscess, nephrolithiasis, pyelonephritis, urinary tract infection, electrolyte and/or metabolic abnormality, testicular/ovarian torsion, shingles.          Problems Addressed:  Nausea and vomiting, unspecified vomiting type: acute illness or injury  Right ovarian cyst: acute illness or injury  RLQ abdominal pain: acute illness or injury    Amount and/or Complexity of Data Reviewed  Labs: ordered. Decision-making details documented in ED Course.  Radiology: ordered. Decision-making details documented in ED Course.    Risk  Prescription drug management.               ED Course as of 08/02/24 2037   Fri Aug 02, 2024   1718 hCG Qualitative, Urine: Negative [CS]   1724 SARS-CoV-2 RNA, Amplification, Qual: Negative [CS]   1750 SARS-CoV-2 RNA, Amplification, Qual: Negative [CS]   1805 CBC auto differential(!)  Mild leukocytosis.  Mild anemia.  Normal platelets [CS]   1805 Lipase Result: 44  Within normal limits [CS]   1805 Comprehensive metabolic panel  Within normal limits.  Normal renal function. [CS]   1805 Discussed workup with my attending Dr. Hernandez [CS]   1844 CT Abdomen Pelvis With IV Contrast NO Oral Contrast  No appendicitis.     Pelvic US ordered to assess for torsion per recommendation of my attending Dr. Hernandez as there is concern for whirlpool sign on CT [CS]   1855 Per radiologist Dr Davila, "looks like prominent vascularity with mild prominence of the gonadal vein which could be borderline pelvic venous congestion syndrome.  Additionally, she may have a high hemorrhagic cyst in the right ovary.  If you worried about ovarian torsion, could consider pelvic ultrasound with attention to the right ovary" [CS]   1946 US Pelvis Complete Non OB  1.2 cm simple cyst " of the right ovary which is likely physiological. Normal vascular flow. No significant pelvic ultrasound abnormality [CS]   1950 Nausea resolved.  No further vomiting here in the ER.  All results discussed with patient. Reassuring workup. I will send zofran to pharmacy. She is to follow up with OBGYN, PCP. ED return precautions discussed.  [CS]      ED Course User Index  [CS] Izabella Bruner PA-C                           Clinical Impression:  Final diagnoses:  [R10.31] RLQ abdominal pain  [N83.201] Right ovarian cyst  [R11.2] Nausea and vomiting, unspecified vomiting type (Primary)          ED Disposition Condition    Discharge Stable          ED Prescriptions       Medication Sig Dispense Start Date End Date Auth. Provider    ondansetron (ZOFRAN) 4 MG tablet Take 1 tablet (4 mg total) by mouth every 6 (six) hours. 12 tablet 8/2/2024 -- Izabella Bruner PA-C          Follow-up Information       Follow up With Specialties Details Why Contact Info    Primary Care Provider  Schedule an appointment as soon as possible for a visit in 1 week               Izabella Bruner PA-C  08/02/24 2037

## 2024-08-03 NOTE — DISCHARGE INSTRUCTIONS
It was nice meeting you, and I hope you feel better soon. You may return to the ER at any time for any new or concerning symptoms, worsening condition, or failure to improve.    Our goal in the ER is to always give you outstanding care and exceptional service. You may receive a survey by mail or email in the next week about your experience in our ED. We would greatly appreciate you completing and returning the survey. Your feedback provides us with a way to recognize our staff who give very good care and it helps us learn how to improve when your experience was below our aspiration of excellence.     Sincerely,     Izabella Bruner PA-C  Emergency Room Physician Assistant  [facility]